# Patient Record
Sex: FEMALE | Race: WHITE
[De-identification: names, ages, dates, MRNs, and addresses within clinical notes are randomized per-mention and may not be internally consistent; named-entity substitution may affect disease eponyms.]

---

## 2018-01-02 ENCOUNTER — HOSPITAL ENCOUNTER (EMERGENCY)
Dept: HOSPITAL 8 - ED | Age: 78
Discharge: HOME | End: 2018-01-02
Payer: COMMERCIAL

## 2018-01-02 VITALS — BODY MASS INDEX: 29.65 KG/M2 | WEIGHT: 167.33 LBS | HEIGHT: 63 IN

## 2018-01-02 VITALS — DIASTOLIC BLOOD PRESSURE: 52 MMHG | SYSTOLIC BLOOD PRESSURE: 122 MMHG

## 2018-01-02 DIAGNOSIS — Z86.73: ICD-10-CM

## 2018-01-02 DIAGNOSIS — J43.9: Primary | ICD-10-CM

## 2018-01-02 DIAGNOSIS — K21.9: ICD-10-CM

## 2018-01-02 DIAGNOSIS — E78.00: ICD-10-CM

## 2018-01-02 DIAGNOSIS — I50.9: ICD-10-CM

## 2018-01-02 DIAGNOSIS — E11.9: ICD-10-CM

## 2018-01-02 DIAGNOSIS — I10: ICD-10-CM

## 2018-01-02 LAB
ALBUMIN SERPL-MCNC: 2.8 G/DL (ref 3.4–5)
ALP SERPL-CCNC: 79 U/L (ref 45–117)
ALT SERPL-CCNC: 14 U/L (ref 12–78)
ANION GAP SERPL CALC-SCNC: 7 MMOL/L (ref 5–15)
BASOPHILS # BLD AUTO: 0.01 X10^3/UL (ref 0–0.1)
BASOPHILS NFR BLD AUTO: 0 % (ref 0–1)
BILIRUB SERPL-MCNC: 0.4 MG/DL (ref 0.2–1)
CALCIUM SERPL-MCNC: 8.3 MG/DL (ref 8.5–10.1)
CHLORIDE SERPL-SCNC: 107 MMOL/L (ref 98–107)
CREAT SERPL-MCNC: 0.97 MG/DL (ref 0.55–1.02)
EOSINOPHIL # BLD AUTO: 0.06 X10^3/UL (ref 0–0.4)
EOSINOPHIL NFR BLD AUTO: 1 % (ref 1–7)
ERYTHROCYTE [DISTWIDTH] IN BLOOD BY AUTOMATED COUNT: 13.8 % (ref 9.6–15.2)
LYMPHOCYTES # BLD AUTO: 0.78 X10^3/UL (ref 1–3.4)
LYMPHOCYTES NFR BLD AUTO: 13 % (ref 22–44)
MCH RBC QN AUTO: 31.1 PG (ref 27–34.8)
MCHC RBC AUTO-ENTMCNC: 33 G/DL (ref 32.4–35.8)
MCV RBC AUTO: 94.2 FL (ref 80–100)
MD: NO
MONOCYTES # BLD AUTO: 0.51 X10^3/UL (ref 0.2–0.8)
MONOCYTES NFR BLD AUTO: 9 % (ref 2–9)
NEUTROPHILS # BLD AUTO: 4.62 X10^3/UL (ref 1.8–6.8)
NEUTROPHILS NFR BLD AUTO: 77 % (ref 42–75)
PLATELET # BLD AUTO: 174 X10^3/UL (ref 130–400)
PMV BLD AUTO: 9.9 FL (ref 7.4–10.4)
PROT SERPL-MCNC: 6.5 G/DL (ref 6.4–8.2)
RBC # BLD AUTO: 4.51 X10^6/UL (ref 3.82–5.3)
TROPONIN I SERPL-MCNC: < 0.015 NG/ML (ref 0–0.04)

## 2018-01-02 PROCEDURE — 84484 ASSAY OF TROPONIN QUANT: CPT

## 2018-01-02 PROCEDURE — 80053 COMPREHEN METABOLIC PANEL: CPT

## 2018-01-02 PROCEDURE — 93005 ELECTROCARDIOGRAM TRACING: CPT

## 2018-01-02 PROCEDURE — 83880 ASSAY OF NATRIURETIC PEPTIDE: CPT

## 2018-01-02 PROCEDURE — 71045 X-RAY EXAM CHEST 1 VIEW: CPT

## 2018-01-02 PROCEDURE — 36415 COLL VENOUS BLD VENIPUNCTURE: CPT

## 2018-01-02 PROCEDURE — 85025 COMPLETE CBC W/AUTO DIFF WBC: CPT

## 2018-01-02 PROCEDURE — 99285 EMERGENCY DEPT VISIT HI MDM: CPT

## 2020-08-28 ENCOUNTER — HOSPITAL ENCOUNTER (EMERGENCY)
Dept: HOSPITAL 8 - ED | Age: 80
Discharge: HOME | End: 2020-08-28
Payer: MEDICARE

## 2020-08-28 VITALS — HEIGHT: 62 IN | BODY MASS INDEX: 30.63 KG/M2 | WEIGHT: 166.45 LBS

## 2020-08-28 VITALS — SYSTOLIC BLOOD PRESSURE: 160 MMHG | DIASTOLIC BLOOD PRESSURE: 77 MMHG
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
## 2020-08-28 DIAGNOSIS — E11.9: ICD-10-CM

## 2020-08-28 DIAGNOSIS — J44.9: ICD-10-CM

## 2020-08-28 DIAGNOSIS — E78.5: ICD-10-CM

## 2020-08-28 DIAGNOSIS — E78.00: ICD-10-CM

## 2020-08-28 DIAGNOSIS — Z90.710: ICD-10-CM

## 2020-08-28 DIAGNOSIS — S61.552A: Primary | ICD-10-CM

## 2020-08-28 DIAGNOSIS — K21.9: ICD-10-CM

## 2020-08-28 DIAGNOSIS — Z86.73: ICD-10-CM
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
## 2020-08-28 DIAGNOSIS — I10: ICD-10-CM

## 2020-08-28 PROCEDURE — 99283 EMERGENCY DEPT VISIT LOW MDM: CPT

## 2020-08-28 PROCEDURE — 90471 IMMUNIZATION ADMIN: CPT

## 2020-08-28 PROCEDURE — 90715 TDAP VACCINE 7 YRS/> IM: CPT

## 2020-08-31 ENCOUNTER — HOSPITAL ENCOUNTER (EMERGENCY)
Dept: HOSPITAL 8 - ED | Age: 80
Discharge: HOME | End: 2020-08-31
Payer: MEDICARE

## 2020-08-31 VITALS — HEIGHT: 62 IN | BODY MASS INDEX: 30.91 KG/M2 | WEIGHT: 167.99 LBS

## 2020-08-31 VITALS — SYSTOLIC BLOOD PRESSURE: 136 MMHG | DIASTOLIC BLOOD PRESSURE: 61 MMHG

## 2020-08-31 DIAGNOSIS — S61.452D: Primary | ICD-10-CM

## 2020-08-31 DIAGNOSIS — I10: ICD-10-CM
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
## 2020-08-31 PROCEDURE — 99281 EMR DPT VST MAYX REQ PHY/QHP: CPT

## 2020-10-06 ENCOUNTER — HOSPITAL ENCOUNTER (OUTPATIENT)
Dept: HOSPITAL 8 - CFH | Age: 80
Discharge: HOME | End: 2020-10-06
Attending: NURSE PRACTITIONER
Payer: MEDICARE

## 2020-10-06 DIAGNOSIS — M81.0: Primary | ICD-10-CM

## 2020-10-06 DIAGNOSIS — N95.9: ICD-10-CM

## 2020-10-06 PROCEDURE — 77080 DXA BONE DENSITY AXIAL: CPT

## 2021-01-13 DIAGNOSIS — Z23 NEED FOR VACCINATION: ICD-10-CM

## 2021-09-07 ENCOUNTER — HOSPITAL ENCOUNTER (EMERGENCY)
Dept: HOSPITAL 8 - ED | Age: 81
Discharge: LEFT BEFORE DISCHARGE | End: 2021-09-07
Payer: COMMERCIAL

## 2021-09-07 VITALS — HEIGHT: 60 IN | BODY MASS INDEX: 34.32 KG/M2 | WEIGHT: 174.83 LBS

## 2021-09-07 VITALS — DIASTOLIC BLOOD PRESSURE: 82 MMHG | SYSTOLIC BLOOD PRESSURE: 141 MMHG

## 2021-09-07 DIAGNOSIS — R07.9: Primary | ICD-10-CM

## 2021-09-07 DIAGNOSIS — R05: ICD-10-CM

## 2021-09-07 DIAGNOSIS — Z20.822: ICD-10-CM

## 2021-09-07 DIAGNOSIS — R06.00: ICD-10-CM

## 2021-09-07 PROCEDURE — U0003 INFECTIOUS AGENT DETECTION BY NUCLEIC ACID (DNA OR RNA); SEVERE ACUTE RESPIRATORY SYNDROME CORONAVIRUS 2 (SARS-COV-2) (CORONAVIRUS DISEASE [COVID-19]), AMPLIFIED PROBE TECHNIQUE, MAKING USE OF HIGH THROUGHPUT TECHNOLOGIES AS DESCRIBED BY CMS-2020-01-R: HCPCS

## 2021-09-07 PROCEDURE — 99284 EMERGENCY DEPT VISIT MOD MDM: CPT

## 2021-09-07 PROCEDURE — 93005 ELECTROCARDIOGRAM TRACING: CPT

## 2021-09-27 ENCOUNTER — TELEPHONE (OUTPATIENT)
Dept: CARDIOLOGY | Facility: MEDICAL CENTER | Age: 81
End: 2021-09-27

## 2021-09-27 NOTE — TELEPHONE ENCOUNTER
Your patient has been flagged through our echocardiogram surveillance with the following echocardiogram results:    Severe Aortic Stenosis    [facility] is dedicated to providing comprehensive care to all of its patients.     We recognize that ensuring excellent communication with our patients' providers during and after care at our facility is a key component in achieving the highest level of care for each patient.    Our Structural Heart Program has implemented a quality initiative aimed at identifying patients with valvular heart disease and confirming a clinical plan for their care upon diagnosis.     At [facility], we are committed to establishing collaborative relationships with the local physician community to ensure that patients receive the highest quality care.     Should you have any questions regarding this information or referral process:    Please contact:  [facility]  directly at [phone]    Respectfully,    NORBERTO Dale.

## 2022-05-26 NOTE — NUR
NA @ [id number] Sheridan Lou  INTERNAL MEDICINE  [address], [address]  [place] [address]  Phone: [phone]  Fax: [phone]  Follow Up Time:     [place] ALEXANDRE  Hematology/Oncology  Phone: ()-  Fax: ()-  Follow Up Time:    Sheridan Lou  INTERNAL MEDICINE  [address], [address]  [place] [address]  Phone: [phone]  Fax: [phone]  Follow Up Time:     [place] ALEXANDRE  Hematology/Oncology  Phone: ()-  Fax: ()-  Follow Up Time:     Tawanda Horn)  Nephrology  [address] [place] [address], [address] [place] [address]  Phone: [phone]  Fax: [phone]  Follow Up Time:    Carmine, Sheridan  INTERNAL MEDICINE  [address], [address]  [place] [address]  Phone: [phone]  Fax: [phone]  Follow Up Time:     Tawanda Horn)  Nephrology  [address] [place] [address], [address]nd [place] [address]  Phone: [phone]  Fax: [phone]  Follow Up Time:

## 2023-02-16 ENCOUNTER — OFFICE VISIT (OUTPATIENT)
Dept: MEDICAL GROUP | Facility: PHYSICIAN GROUP | Age: 83
End: 2023-02-16
Payer: MEDICARE

## 2023-02-16 VITALS
SYSTOLIC BLOOD PRESSURE: 128 MMHG | OXYGEN SATURATION: 92 % | DIASTOLIC BLOOD PRESSURE: 64 MMHG | HEIGHT: 60 IN | TEMPERATURE: 98.8 F | HEART RATE: 116 BPM | BODY MASS INDEX: 35.93 KG/M2 | WEIGHT: 183 LBS

## 2023-02-16 DIAGNOSIS — R40.4 ALTERED LEVEL OF CONSCIOUSNESS: ICD-10-CM

## 2023-02-16 DIAGNOSIS — I50.84 END STAGE HEART FAILURE (HCC): ICD-10-CM

## 2023-02-16 DIAGNOSIS — I50.9 HEART FAILURE, UNSPECIFIED HF CHRONICITY, UNSPECIFIED HEART FAILURE TYPE (HCC): ICD-10-CM

## 2023-02-16 DIAGNOSIS — F32.A DEPRESSIVE DISORDER: ICD-10-CM

## 2023-02-16 PROBLEM — E11.21 DIABETIC NEPHROPATHY ASSOCIATED WITH TYPE 2 DIABETES MELLITUS (HCC): Status: ACTIVE | Noted: 2023-02-16

## 2023-02-16 PROBLEM — E55.9 VITAMIN D DEFICIENCY: Status: ACTIVE | Noted: 2023-02-16

## 2023-02-16 PROBLEM — N18.30 STAGE 3 CHRONIC KIDNEY DISEASE: Status: ACTIVE | Noted: 2023-02-16

## 2023-02-16 PROBLEM — H35.30 AGE-RELATED MACULAR DEGENERATION: Status: ACTIVE | Noted: 2023-02-16

## 2023-02-16 PROBLEM — I34.2 NONRHEUMATIC MITRAL VALVE STENOSIS: Status: ACTIVE | Noted: 2022-06-06

## 2023-02-16 PROBLEM — E11.9 TYPE 2 DIABETES MELLITUS WITHOUT COMPLICATION (HCC): Status: ACTIVE | Noted: 2023-02-16

## 2023-02-16 PROBLEM — I10 ESSENTIAL (PRIMARY) HYPERTENSION: Status: ACTIVE | Noted: 2022-06-06

## 2023-02-16 PROBLEM — E78.2 MIXED HYPERLIPIDEMIA: Status: ACTIVE | Noted: 2023-02-16

## 2023-02-16 PROBLEM — I35.0 NONRHEUMATIC AORTIC VALVE STENOSIS: Status: ACTIVE | Noted: 2022-06-06

## 2023-02-16 PROBLEM — M81.0 OSTEOPOROSIS: Status: ACTIVE | Noted: 2023-02-16

## 2023-02-16 PROCEDURE — 99205 OFFICE O/P NEW HI 60 MIN: CPT

## 2023-02-16 RX ORDER — ATORVASTATIN CALCIUM 40 MG/1
TABLET, FILM COATED ORAL
COMMUNITY
End: 2023-02-16

## 2023-02-16 RX ORDER — ASPIRIN 81 MG/1
81 TABLET, CHEWABLE ORAL DAILY
COMMUNITY

## 2023-02-16 ASSESSMENT — ENCOUNTER SYMPTOMS
DEPRESSION: 1
NERVOUS/ANXIOUS: 1
HALLUCINATIONS: 1
SHORTNESS OF BREATH: 1
MEMORY LOSS: 1
LOSS OF CONSCIOUSNESS: 1

## 2023-02-16 ASSESSMENT — PATIENT HEALTH QUESTIONNAIRE - PHQ9: CLINICAL INTERPRETATION OF PHQ2 SCORE: 0

## 2023-02-16 NOTE — ASSESSMENT & PLAN NOTE
"Chronic, unstable. Pt was recently hospitalized at [facility], she verbalizes she was told she has 10 % EF, with fluid around the heart. Pt reports she signed out AMA from the ER after she was told there was \"nothing they could do for her\". She states she would prefer to be at home. Pt reports she is ready to go, but requesting help at home for end of life care.     In clinic pt presents with shortness of breath on exertion, legs with 3-4+ pitting edema bilaterally, diminished lung sounds in bases and heart murmur 3/6 systolic heard.  After reviewing Notes from the ER It seems there has been some misunderstanding in her perception of her disease, as notes document recommendation for hospitalization for medical management of symptoms including shortness of breath and leg swelling secondary to severe aortic stenosis and other known and unknown comorbidity.    Documented in ER note:   DIFFERENTIAL DIAGNOSES     Includes but not limited to congestive heart failure, aortic stenosis, pulmonary edema, pneumonia or infection, acute coronary syndrome    CXR interpretation:   MODERATE CARDIOMEGALY.    MILD PULMONARY VENOUS CONGESTION AND INTERSTITIAL EDEMA CONSISTENT WITH MILD CHF.     Discussed advanced directives, Packet provided, discussed and signed polst.  -referral for hospice placed  "

## 2023-02-16 NOTE — PROGRESS NOTES
"Subjective:     CC: Pt presents today with her daughter, Jasmin with whom she lives. Pt has worsening heart failure, dementia, was recently hospitalized for worsening symptoms of heart failure, and was told she has reduced EF at 10%. She and her daughter verbalize she is wanting end of life care, and need help establishing this. \"Maisha\" describes she does not wish to take any extra medications, and wishes to aim her care to comfort and palliative. I am unable to find any record of an Echo being done.    HPI:   Raghav presents today with    Problem   Depressive Disorder   Mixed Hyperlipidemia   Osteoporosis   Stage 3 Chronic Kidney Disease (Hcc)   Type 2 Diabetes Mellitus Without Complication (Hcc)   Vitamin D Deficiency   Diabetic Nephropathy Associated With Type 2 Diabetes Mellitus (Hcc)   Age-Related Macular Degeneration   End Stage Heart Failure (Hcc)   Altered Level of Consciousness   Essential (Primary) Hypertension   Nonrheumatic Aortic Valve Stenosis   Nonrheumatic Mitral Valve Stenosis     ROS:  Review of Systems   Respiratory:  Positive for shortness of breath.    Cardiovascular:  Positive for leg swelling. Negative for chest pain.   Neurological:  Positive for loss of consciousness.   Psychiatric/Behavioral:  Positive for depression, hallucinations and memory loss. The patient is nervous/anxious.    All other systems reviewed and are negative.    Objective:     Exam:  /64 (BP Location: Right arm, Patient Position: Sitting, BP Cuff Size: Adult)   Pulse (!) 116   Temp 37.1 °C (98.8 °F) (Temporal)   Ht 1.524 m (5')   Wt 83 kg (183 lb)   SpO2 92%   BMI 35.74 kg/m²  Body mass index is 35.74 kg/m².    Physical Exam  Vitals reviewed.   Constitutional:       General: She is in acute distress.      Appearance: She is obese. She is ill-appearing.   HENT:      Head: Normocephalic and atraumatic.   Cardiovascular:      Rate and Rhythm: Regular rhythm. Tachycardia present.      Heart sounds: Murmur heard. " "  Systolic murmur is present with a grade of 3/6.   Pulmonary:      Effort: Respiratory distress present.      Breath sounds: Decreased air movement present.   Abdominal:      General: Bowel sounds are normal.   Musculoskeletal:      Right lower le+ Edema present.      Left lower le+ Edema present.   Skin:     General: Skin is warm.      Coloration: Skin is pale.   Neurological:      Mental Status: She is alert. She is disoriented and confused.      Motor: Weakness present.   Psychiatric:         Mood and Affect: Mood is anxious and depressed. Affect is tearful.     Assessment & Plan:     82 y.o. female with the following -     Problem List Items Addressed This Visit       Depressive disorder     Chronic, stable continue paxil.         End stage heart failure (HCC)     Chronic, unstable. Pt was recently hospitalized at [facility], she verbalizes she was told she has 10 % EF, with fluid around the heart. Pt reports she signed out AMA from the ER after she was told there was \"nothing they could do for her\". She states she would prefer to be at home. Pt reports she is ready to go, but requesting help at home for end of life care.     In clinic pt presents with shortness of breath on exertion, legs with 3-4+ pitting edema bilaterally, diminished lung sounds in bases and heart murmur 3/6 systolic heard.  After reviewing Notes from the ER It seems there has been some misunderstanding in her perception of her disease, as notes document recommendation for hospitalization for medical management of symptoms including shortness of breath and leg swelling secondary to severe aortic stenosis and other known and unknown comorbidity.    Documented in ER note:   DIFFERENTIAL DIAGNOSES     Includes but not limited to congestive heart failure, aortic stenosis, pulmonary edema, pneumonia or infection, acute coronary syndrome    CXR interpretation:   MODERATE CARDIOMEGALY.    MILD PULMONARY VENOUS CONGESTION AND INTERSTITIAL EDEMA " CONSISTENT WITH MILD CHF.     Discussed advanced directives, Packet provided, discussed and signed polst.  -referral for hospice placed         Altered level of consciousness     Chronic, unstable. Pt daughter reports she has dementia, and has been having auditory hallucinations. In clinic, she presents oriented to self, time and situation.           Other Visit Diagnoses       Heart failure, unspecified HF chronicity, unspecified heart failure type (HCC)        Relevant Orders    Referral to Hospice          Supportive care, differential diagnoses, and indications for immediate follow-up discussed with patient.    Pathogenesis of diagnosis discussed including typical length and natural progression.      Instructed to return to clinic or nearest emergency department for any change in condition, further concerns, or worsening of symptoms.  Patient states understanding of the plan of care and discharge instructions.    I have placed the below orders and discussed them with an approved delegating provider.  The MA is performing the below orders under the direction of Dr. Solis.    I spent a total of 34 minutes with record review, exam, communication with the patient, communication with other providers, and documentation of this encounter.    Return if symptoms worsen or fail to improve.    Please note that this dictation was created using voice recognition software. I have made every reasonable attempt to correct obvious errors, but I expect that there are errors of grammar and possibly content that I did not discover before finalizing the note.

## 2023-02-17 NOTE — ASSESSMENT & PLAN NOTE
Chronic, unstable. Pt daughter reports she has dementia, and has been having auditory hallucinations. In clinic, she presents oriented to self, time and situation.